# Patient Record
(demographics unavailable — no encounter records)

---

## 2024-10-14 NOTE — ASSESSMENT
[FreeTextEntry1] : Ms. 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  no other s/s of active aiCTD.   HCQ (2022 - present) with signs of hair regrowth  -- check inflammatory markers  -- continue HCQ for now - check levels to assess if this flare related to dropping therapeutic levels -- given also on topicals for the hair - would start with one of those coming off for now -- is better on the plaquneil overall - unclear if the joint and skin is related to the UCTD, MCTD or a new process eg pso -- MRI of the hands  # rash new since last visit. initially forehead rash was 2/2 adhesive tape but that doesnt explain the arms and the chest wall  -- rec derm evaluation to identify what process is going on  #medication monitoring, long term use of drug -- HCQ - eye checks annually  -- check labs -- HCQ - check level    [facility] serve as the continuing focal point for needed health care services that are part of ongoing care related to a patient's single, serious condition or a complex condition.   More than 50% of the encounter was spent counseling the patient on differential, workup, disease course and treatment/management. Education was provided to the patient during this encounter. All questions and concerns were addressed and answered. The patient verbalized understanding and agreed to the plan.   Patient has been instructed to call for an appointment if new symptoms develop. Patient has been instructed to make a follow-up appointment in 3 months   Time spent on the encounter included, but is not limited to, preparing to see the patient, obtaining and/or reviewing separately obtained history, performing the evaluation, counseling and educating, independently interpreting results with communication to patient, order placement, referring and/or communicating with other health professionals as described, and documenting clinical information in the electronic health record.

## 2024-10-14 NOTE — PHYSICAL EXAM
[General Appearance - Alert] : alert [General Appearance - In No Acute Distress] : in no acute distress [Auscultation Breath Sounds / Voice Sounds] : lungs were clear to auscultation bilaterally [Skin Color & Pigmentation] : normal skin color and pigmentation [] : no rash [Skin Lesions] : no skin lesions [Oriented To Time, Place, And Person] : oriented to person, place, and time [Impaired Insight] : insight and judgment were intact [Affect] : the affect was normal [Abnormal Walk] : normal gait [Nail Clubbing] : no clubbing  or cyanosis of the fingernails [Musculoskeletal - Swelling] : no joint swelling seen [Motor Tone] : muscle strength and tone were normal [FreeTextEntry1] : b thumb squaring right MC 3 swelling synvoitis left DIP 5 loss of extension

## 2024-10-14 NOTE — HISTORY OF PRESENT ILLNESS
[___ Month(s) Ago] : [unfilled] month(s) ago [FreeTextEntry1] : Ms. Ramos is a woman PMHX CAD s/p stent; HTN, DMII, inc CPK, pituitary adenoma, HLD here for follow-up of aiCTD  INTERVAL HISTORY  in terms of the joint pain - overall doing well  - all joints are relatively okay  - only one that remains swollen is the right middle finger large knuckle - not terrible at this time though and completely functional   in terms of medicaitons  - adherent and tolerant   in terms of new issues  - s/p cataract/lasik -> photosensitivity  - now with rash - started at juwan forehead at the site of the adhesive tape - seems to be getting better  - developed rash over the chest - no itchiness  - also has it on the arms

## 2024-10-24 NOTE — ASSESSMENT
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 Pt opts for latter - reasonable to do short trial and reassess -- if not improving, recommend biopsy to clarify further - TAC 0.1% ointment BID  Suspect SK on back that has partially resolved - reassess at followup

## 2024-10-24 NOTE — PHYSICAL EXAM
[Alert] : alert [Oriented x 3] : ~L oriented x 3 [Well Nourished] : well nourished [Conjunctiva Non-injected] : conjunctiva non-injected [No Visual Lymphadenopathy] : no visual  lymphadenopathy [No Clubbing] : no clubbing [No Edema] : no edema [No Bromhidrosis] : no bromhidrosis [No Chromhidrosis] : no chromhidrosis [FreeTextEntry3] : Scarring alopecia of the frontal and temporal scalp violaceous to hyperpigmented patch on frontal scalp, around neck, arms bilaterally

## 2024-11-26 NOTE — ASSESSMENT
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 Given improvement  with trial of tac, will continue to monitor on treatment and reassess should rash worsen.   - C/w  TAC 0.1% ointment BID, apply only to active areas on rash on scalp  RTC 2 mo

## 2024-11-26 NOTE — HISTORY OF PRESENT ILLNESS
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 no other s/s of active aiCTD. HCQ (2022 - present) previously seen by Dr. Perea for presumed FFA  in June developed rash on scalp after tape used during cataract surgery also developed rash on arms and around neck later (07-08/2024)  11/26/2024: Since LV has been using triamcinolone to rash on forehead/arms/neck with improvement in itch and pain, doesn't note much change in color. Discoloration on arms and neck unchanged. Notes that the rashes on arms/neck started as purple itchy spots with a red rim that healed as brown flat spots. Scalp continues to be asymptomatic, denies itch, pain, shedding.  also has growth on right back

## 2024-11-26 NOTE — PHYSICAL EXAM
[Alert] : alert [Oriented x 3] : ~L oriented x 3 [Well Nourished] : well nourished [Conjunctiva Non-injected] : conjunctiva non-injected [No Visual Lymphadenopathy] : no visual  lymphadenopathy [No Clubbing] : no clubbing [No Edema] : no edema [No Bromhidrosis] : no bromhidrosis [No Chromhidrosis] : no chromhidrosis [FreeTextEntry3] : Scarring alopecia of the frontal and temporal scalp violaceous to hyperpigmented patch on frontal scalp, around neck, arms bilaterally--fading in color compared to LV, less erythema on scalp lesion  brown reticulated patch in buccal mucosa and gingiva

## 2024-12-13 NOTE — HISTORY OF PRESENT ILLNESS
[FreeTextEntry1] : GEORGINA is a 74 year F w/MHx of CAD s/p PCI, Carotid artery disease, HLD, HTN, T2DM, who presents for follow up. The patient denies fever, chills, sore throat, loss of taste or smell, muscle aches weight loss, malaise, rash, recent travel, insect bites, alteration bowel habits, headaches, weakness, abdominal  pain, bloating, changes in urination, visual disturbances, shortness of breath, chest pain, dizziness, palpitations.  The patient presents for routine follow up of their coronary artery disease.   The patient has been following all secondary prevents measures and antiplatelet therapy. The patient denies shortness of breath, chest pain, dizziness, palpitations.  The patient is here for follow-up of elevated cholesterol. Patient is currently tolerating medication and denies muscle pain, joint pain, back pain,  urinary changes , nausea, vomiting, abdominal pain or diarrhea. The patient is trying to follow a low cholesterol diet.  The patient here for evaluation of high blood pressure. Patient is currently tolerating the current antihypertensive regime and they deny headaches, stiff neck, visual changes, or PND. The patient has been trying to stay on a low-sodium diet.  The patient also presents for continued care of diabetes mellitus. Patient is following the diabetic regimen. Patient is tolerating hypoglycemic agents and following the diet. Patient denies any visual changes, blood in the urine, abdominal pain, nausea, vomiting, myalgias, arthralgias, paresthesias and syncope. The patient denies any chest discomfort, shortness of breath or new neurologic signs or symptoms. Patient denies any polyuria, worsening nocturia, or polydipsia.

## 2025-01-28 NOTE — PHYSICAL EXAM
[Alert] : alert [Oriented x 3] : ~L oriented x 3 [Well Nourished] : well nourished [Conjunctiva Non-injected] : conjunctiva non-injected [No Visual Lymphadenopathy] : no visual  lymphadenopathy [No Clubbing] : no clubbing [No Edema] : no edema [No Bromhidrosis] : no bromhidrosis [No Chromhidrosis] : no chromhidrosis [FreeTextEntry3] : Scarring alopecia of the frontal and temporal scalp violaceous to hyperpigmented patch on frontal scalp, around neck, arms bilaterally--fading in color compared to LV, less erythema/surface change on scalp lesion

## 2025-01-28 NOTE — HISTORY OF PRESENT ILLNESS
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 no other s/s of active aiCTD. HCQ (2022 - present) previously seen by Dr. Perea for presumed FFA (burnt out FFA mostly-never biopsied, treated with doxy, topical clobetasol, topical minoxidil) in June developed rash on scalp after tape used during cataract surgery also developed rash on arms and around neck later (07-08/2024)  11/26/2024: Since  has been using triamcinolone to rash on forehead/arms/neck with improvement in itch and pain, doesn't note much change in color. Discoloration on arms and neck unchanged. Notes that the rashes on arms/neck started as purple itchy spots with a red rim that healed as brown flat spots. Scalp continues to be asymptomatic, denies itch, pain, shedding. 1/28/2025: Since , using triamcinolone BID with improvement in discoloration, erythema and bumpiness resolved, asymptomatic   also has growth on right back

## 2025-01-28 NOTE — ASSESSMENT
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 Given improvement  with trial of tac and patient preference will continue to monitor on treatment and reassess should rash worsen.   -Stop triamcinolone  RTC 3 mo

## 2025-02-11 NOTE — HISTORY OF PRESENT ILLNESS
[___ Month(s) Ago] : [unfilled] month(s) ago [FreeTextEntry1] : Ms. Ramos is a woman PMHX CAD s/p stent; HTN, DMII, inc CPK, pituitary adenoma, HLD here for follow-up of aiCTD  INTERVAL HISTORY  in terms of the joint pain - overall doing well  - all joints are relatively okay  - only one that remains swollen is the right middle finger large knuckle - not terrible at this time though and completely functional   in terms of medicaitons  - adherent and tolerant   in terms of new issues  - doing better in terms of juwan scalp rash

## 2025-02-11 NOTE — ASSESSMENT
[FreeTextEntry1] : Ms. 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  no other s/s of active aiCTD.   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 initially forehead rash was 2/2 adhesive tape but that doesnt explain the arms and the chest wall  -- appreciate derm evaluation -- workign with derm and pleased iwth improvement thus far -- unclear if lichen planus ?  lichenoid dermtaitis?   will reach out to derm to see if they think HCQ involved at all.  as resolving on HCQ would think this is less likely but will inquire  #medication monitoring, long term use of drug -- HCQ - eye checks annually  -- check labs -- HCQ - check level   =========================================== [facility] serve as the continuing focal point for needed health care services that are part of ongoing care related to a patient's single, serious condition or a complex condition.   More than 50% of the encounter was spent counseling the patient on differential, workup, disease course and treatment/management. Education was provided to the patient during this encounter. All questions and concerns were addressed and answered. The patient verbalized understanding and agreed to the plan.   Patient has been instructed to call for an appointment if new symptoms develop. Patient has been instructed to make a follow-up appointment in 3 months   Time spent on the encounter included, but is not limited to, preparing to see the patient, obtaining and/or reviewing separately obtained history, performing the evaluation, counseling and educating, independently interpreting results with communication to patient, order placement, referring and/or communicating with other health professionals as described, and documenting clinical information in the electronic health record.

## 2025-03-20 NOTE — HISTORY OF PRESENT ILLNESS
[FreeTextEntry1] : This is a 75 year y/o female with a PMHx of CAD s/p PCI, Carotid artery disease, HLD, HTN, T2DM presents today for follow up.   s/p NST 1/2025 - Normal myocardial perfusion scan, with no evidence of infarction or inducible ischemia. Pt still doing cardiac rehab and doing well.   The patient presents for routine follow up of their coronary artery disease. The patient has been following all secondary prevents measures and antiplatelet therapy. The patient denies shortness of breath, chest pain, dizziness, palpitations, easy bruising/bleeding/hematuria/blood in stool.  The patient is here for follow-up of elevated cholesterol. Currently tolerating prescribed medications. Denies muscle pain, joint pain, back pain, urinary changes, nausea, vomiting, abdominal pain or diarrhea. The patient is trying to follow a low cholesterol diet.  The patient is here for evaluation of high blood pressure. Currently tolerating antihypertensive medications. Denies headaches, stiff neck, visual changes, or PND. The patient has been trying to stay on a low-sodium diet.  The patient also presents for continued care of diabetes mellitus. Patient is following recommended diabetic regimen. Tolerating hypoglycemic agents and diet recommendations. Denies visual changes, confusion, palpitations, tremors, diaphoresis, blood in the urine, abdominal pain, nausea, vomiting, myalgias, arthralgias, paresthesias and syncope. Too denies any chest discomfort, shortness of breath or new neurologic signs or symptoms. Also denies any polyuria, worsening nocturia, or polydipsia.

## 2025-04-29 NOTE — ASSESSMENT
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 Patient interested in cream to help with PIH. Start hydroquinone as below Differential includes lichen planus/LP-like drug reaction, consider manifestation of lupus though less likely based on clinical presentation today  --Discussed utility of biopsy to confirm diagnosis, patient initially deferred. Given improvement, ok to defer for now but may consider biopsy in the future to confirm diagnosis Overlying inflammation today, likely irritant dermatitis, restart triamcinolone as below. -Re-start triamcinolone of discolored patch on forehead  - START [facility] hydroquinone 4.5%, tretinoin.025%, fluocinolone to hyperpigmented areas on arms once nightly for 3 months and then take 1 month break - Risk of irritation, redness, ochronosis discussed as potential side effects in setting of prolonged use    RTC 3 mo

## 2025-04-29 NOTE — HISTORY OF PRESENT ILLNESS
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 no other s/s of active aiCTD. HCQ (2022 - present) previously seen by Dr. Perea for presumed FFA (burnt out FFA mostly-never biopsied, treated with doxy, topical clobetasol, topical minoxidil) in June developed rash on scalp after tape used during cataract surgery also developed rash on arms and around neck later (07-08/2024)  11/26/2024: Since  has been using triamcinolone to rash on forehead/arms/neck with improvement in itch and pain, doesn't note much change in color. Discoloration on arms and neck unchanged. Notes that the rashes on arms/neck started as purple itchy spots with a red rim that healed as brown flat spots. Scalp continues to be asymptomatic, denies itch, pain, shedding. 1/28/2025: Since , using triamcinolone BID with improvement in discoloration, erythema and bumpiness resolved, asymptomatic  4/29/2025: Since LV, notes itching on site of discoloration on forehead. Patches on arms and neck continuing to fade

## 2025-04-29 NOTE — PHYSICAL EXAM
[Alert] : alert [Oriented x 3] : ~L oriented x 3 [Well Nourished] : well nourished [Conjunctiva Non-injected] : conjunctiva non-injected [No Visual Lymphadenopathy] : no visual  lymphadenopathy [No Clubbing] : no clubbing [No Edema] : no edema [No Bromhidrosis] : no bromhidrosis [No Chromhidrosis] : no chromhidrosis [FreeTextEntry3] : Scarring alopecia of the frontal and temporal scalp violaceous to hyperpigmented patch on frontal scalp, around neck, arms bilaterally--fading in color compared to LV, however with erythema and superficial scaling on this area

## 2025-05-14 NOTE — HISTORY OF PRESENT ILLNESS
[___ Month(s) Ago] : [unfilled] month(s) ago [FreeTextEntry1] : Ms. Ramos is a woman PMHX CAD s/p stent; HTN, DMII, inc CPK, pituitary adenoma, HLD here for follow-up of aiCTD  INTERVAL HISTORY  in terms of the new issues - largely related to her recent dental issues.  She has had lifelong dental isslues which have been particularly active at this point.   has had crown and gum line cavities  - going for 2nd breast scan  - since last wed - the whole rib cage feels like it is in a vice iwth pins - feels cold in her chest.  no sob and no cp or radiation.  all the time not exertion related.  when move or cough . is in cardio rehab - will check in with t he cardio just in case   - DEXA the beginning OP  in terms of the joint pain - overall doing well  - all joints are relatively okay  - only one that remains swollen is the right middle finger large knuckle - not terrible at this time though and completely functional   in terms of medicaitons  - adherent and tolerant   in terms of new issues  - doing better in terms of juwan scalp rash

## 2025-05-14 NOTE — ASSESSMENT
[FreeTextEntry1] : Ms. 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  no other s/s of active aiCTD.  RNP and RNA polymerase III now negative.  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 initially forehead rash was 2/2 adhesive tape but that doesnt explain the arms and the chest wall  -- appreciate derm evaluation -- workign with derm and pleased iwth improvement thus far -- unclear if lichen planus ?  lichenoid dermtaitis?   will reach out to derm to see if they think HCQ involved at all.  as resolving on HCQ would think this is less likely but will inquire  #medication monitoring, long term use of drug -- HCQ - eye checks annually  -- check labs -- HCQ - check level   =========================================== [facility] serve as the continuing focal point for needed health care services that are part of ongoing care related to a patient's single, serious condition or a complex condition.   More than 50% of the encounter was spent counseling the patient on differential, workup, disease course and treatment/management. Education was provided to the patient during this encounter. All questions and concerns were addressed and answered. The patient verbalized understanding and agreed to the plan.   Patient has been instructed to call for an appointment if new symptoms develop. Patient has been instructed to make a follow-up appointment in 3 months   Time spent on the encounter included, but is not limited to, preparing to see the patient, obtaining and/or reviewing separately obtained history, performing the evaluation, counseling and educating, independently interpreting results with communication to patient, order placement, referring and/or communicating with other health professionals as described, and documenting clinical information in the electronic health record.

## 2025-05-22 NOTE — HISTORY OF PRESENT ILLNESS
[FreeTextEntry1] : Ms. Ramos is a 75-year-old female returns today in follow up with regard to a history of type 2 diabetes mellitus. There is no known history of retinopathy, or nephropathy. With regard to neuropathy, she denies any neurologic s/s.  Additional medical history includes that of hyperlipidemia, CAD s/p one stent in rt coronary May 2018. - Pt with osteoarthritis. Follows with rheumatologist.  - Too, with hx of alopecia. Following with Dr. Perea. Was placed on  mg, clobetasol solution, and minoxidil foam BID. She does note improvement in hair growth.  - Currently undergoing study, per patient, she had 3 signs of Lupus.  She continues off of all diabetic medication.   Not using Kellen right now due to ongoing difficulty switching from Kellen 3 to Kellen 3 +. Her insurance is not covering it. Home glucose monitoring is not carried out. She does not like sticking her finger. She does deny any significant hypoglycemic signs and symptoms of late.  She does admit to not monitoring diet well. Drinks cirkul flavored water.  POCT A1C returned today at 5.9%. Previously returned at 6.2% on 03/20/25. POCT glucose today at 112 mg/dL.  She denies any chest pain, sob, neurologic or ophthalmologic complaints. She denies any new podiatric concerns. She is up to date with her ophthalmologic visit- no diabetic changes noted; follows with Dr. Hamm. She had Lasix surgery for cataracts with Dr. Alexis at [facility]. ____________________________________________________________________________________________________ Patient had carotid doppler with Dr. HDZ on 08/01/24 which incidentally showed thyroid nodule.   Latest thyroid US from 08/06/24 showed a 0.40 x 0.34 x 0.36 cm nodule in the right upper pole as well as a 0.77 x 0.45 x 0.65 cm nodule in the left mid pole. The nodularity is non-high risk per Tirads criteria.  Denies anterior neck discomfort, difficulty swallowing, or any change in the appearance of the neck region. ____________________________________________________________________________________________________ Too recently noted to have osteoporosis.  Latest DEXA scan from 04/24/25 did show T-scores: Spine: -2.1 Femoral neck: -3.0 Total hip: -1.2 The lower thoracic and upper lumbar spine are not clearly visualized due to overlying bowel gas and technique, if there is clinical concern for wedge compression deformity, CT thoracolumbar spine may be obtained.  Denies any adult bone fractures. Denies FH of osteoporosis.  No known hx of serum calcium elevations or hx of kidney stones. She denies hx of prolonged corticosteroid usage. No hx of prolonged immobilization.  Denies use of PPI agents. No known celiac hx but is lactose intolerant.  Does take D3 3000 IU daily. Does not take calcium supplementation and does not have adequate calcium in her diet. In the past 3 weeks, she had a root canal and crown and will f/u with dentist in June 2025. Has dental evaluation q6 months. ____________________________________________________________________________________________________ MRI of pituitary from 08/21/2020 and 10/15/2021 did not show evidence for pituitary adenoma whereas a prior MRI from 2/20/2020 had shown a possible 2x2x2 mm apparent pituitary adenoma. Pituitary hormonal eval -neg.  Additional medications: Rosuvastatin 10 mg  Supplements: Vitamin A daily (for many years per another physician), vitamin D3 3,000 IU daily

## 2025-05-22 NOTE — HISTORY OF PRESENT ILLNESS
[FreeTextEntry1] : Ms. Ramos is a 75-year-old female returns today in follow up with regard to a history of type 2 diabetes mellitus. There is no known history of retinopathy, or nephropathy. With regard to neuropathy, she denies any neurologic s/s.  Additional medical history includes that of hyperlipidemia, CAD s/p one stent in rt coronary May 2018. - Pt with osteoarthritis. Follows with rheumatologist.  - Too, with hx of alopecia. Following with Dr. Perea. Was placed on  mg, clobetasol solution, and minoxidil foam BID. She does note improvement in hair growth.  - Currently undergoing study, per patient, she had 3 signs of Lupus.  She continues off of all diabetic medication.   Not using Kellen right now due to ongoing difficulty switching from Kellen 3 to Kellen 3 +. Her insurance is not covering it. Home glucose monitoring is not carried out. She does not like sticking her finger. She does deny any significant hypoglycemic signs and symptoms of late.  She does admit to not monitoring diet well. Drinks cirkul flavored water.  POCT A1C returned today at 5.9%. Previously returned at 6.2% on 03/20/25. POCT glucose today at 112 mg/dL.  She denies any chest pain, sob, neurologic or ophthalmologic complaints. She denies any new podiatric concerns. She is up to date with her ophthalmologic visit- no diabetic changes noted; follows with Dr. Hamm. She had Lasix surgery for cataracts with Dr. Alexis at [facility]. ____________________________________________________________________________________________________ Patient had carotid doppler with Dr. HDZ on 08/01/24 which incidentally showed thyroid nodule.   Latest thyroid US from 08/06/24 showed a 0.40 x 0.34 x 0.36 cm nodule in the right upper pole as well as a 0.77 x 0.45 x 0.65 cm nodule in the left mid pole. The nodularity is non-high risk per Tirads criteria.  Denies anterior neck discomfort, difficulty swallowing, or any change in the appearance of the neck region. ____________________________________________________________________________________________________ Too recently noted to have osteoporosis.  Latest DEXA scan from 04/24/25 did show T-scores: Spine: -2.1 Femoral neck: -3.0 Total hip: -1.2 The lower thoracic and upper lumbar spine are not clearly visualized due to overlying bowel gas and technique, if there is clinical concern for wedge compression deformity, CT thoracolumbar spine may be obtained.  Denies any adult bone fractures. Denies FH of osteoporosis.  No known hx of serum calcium elevations or hx of kidney stones. She denies hx of prolonged corticosteroid usage. No hx of prolonged immobilization.  Denies use of PPI agents. No known celiac hx but is lactose intolerant.  Does take D3 3000 IU daily. Does not take calcium supplementation and does not have adequate calcium in her diet. In the past 3 weeks, she had a root canal and crown and will f/u with dentist in June 2025. Has dental evaluation q6 months. ____________________________________________________________________________________________________ MRI of pituitary from 08/21/2020 and 10/15/2021 did not show evidence for pituitary adenoma whereas a prior MRI from 2/20/2020 had shown a possible 2x2x2 mm apparent pituitary adenoma. Pituitary hormonal eval -neg.  Additional medications: Rosuvastatin 10 mg  Supplements: Vitamin A daily (for many years per another physician), vitamin D3 3,000 IU daily

## 2025-05-22 NOTE — ADDENDUM
[FreeTextEntry1] : POCT glucose testing and Hemoglobin A1c was carried out today given diabetes diagnosis. Blood will be drawn in office today.  This note was written by Margi Barnes on 05/22/2025 acting as medical scribe for Dr. Grey Becker. I, Dr. Grey Becker, have read and attest that all the information, medical decision making and discharge instructions within are true and accurate.

## 2025-05-22 NOTE — HISTORY OF PRESENT ILLNESS
[FreeTextEntry1] : Ms. Ramos is a 75-year-old female returns today in follow up with regard to a history of type 2 diabetes mellitus. There is no known history of retinopathy, or nephropathy. With regard to neuropathy, she denies any neurologic s/s.  Additional medical history includes that of hyperlipidemia, CAD s/p one stent in rt coronary May 2018. - Pt with osteoarthritis. Follows with rheumatologist.  - Too, with hx of alopecia. Following with Dr. Perea. Was placed on  mg, clobetasol solution, and minoxidil foam BID. She does note improvement in hair growth.  - Currently undergoing study, per patient, she had 3 signs of Lupus.  She continues off of all diabetic medication.   Not using Kellen right now due to ongoing difficulty switching from Kellen 3 to Kellen 3 +. Her insurance is not covering it. Home glucose monitoring is not carried out. She does not like sticking her finger. She does deny any significant hypoglycemic signs and symptoms of late.  She does admit to not monitoring diet well. Drinks cirkul flavored water.  POCT A1C returned today at 5.9%. Previously returned at 6.2% on 03/20/25. POCT glucose today at 112 mg/dL.  She denies any chest pain, sob, neurologic or ophthalmologic complaints. She denies any new podiatric concerns. She is up to date with her ophthalmologic visit- no diabetic changes noted; follows with Dr. Hamm. She had Lasix surgery for cataracts with Dr. Alexis at [facility]. ____________________________________________________________________________________________________ Patient had carotid doppler with Dr. HDZ on 08/01/24 which incidentally showed thyroid nodule.   Latest thyroid US from 08/06/24 showed a 0.40 x 0.34 x 0.36 cm nodule in the right upper pole as well as a 0.77 x 0.45 x 0.65 cm nodule in the left mid pole. The nodularity is non-high risk per Tirads criteria.  Denies anterior neck discomfort, difficulty swallowing, or any change in the appearance of the neck region. ____________________________________________________________________________________________________ Too recently noted to have osteoporosis.  Latest DEXA scan from 04/24/25 did show T-scores: Spine: -2.1 Femoral neck: -3.0 Total hip: -1.2 The lower thoracic and upper lumbar spine are not clearly visualized due to overlying bowel gas and technique, if there is clinical concern for wedge compression deformity, CT thoracolumbar spine may be obtained.  Denies any adult bone fractures. Denies FH of osteoporosis.  No known hx of serum calcium elevations or hx of kidney stones. She denies hx of prolonged corticosteroid usage. No hx of prolonged immobilization.  Denies use of PPI agents. No known celiac hx but is lactose intolerant.  Does take D3 3000 IU daily. Does not take calcium supplementation and does not have adequate calcium in her diet. In the past 3 weeks, she had a root canal and crown and will f/u with dentist in June 2025. Has dental evaluation q6 months. ____________________________________________________________________________________________________ MRI of pituitary from 08/21/2020 and 10/15/2021 did not show evidence for pituitary adenoma whereas a prior MRI from 2/20/2020 had shown a possible 2x2x2 mm apparent pituitary adenoma. Pituitary hormonal eval -neg.  Additional medications: Rosuvastatin 10 mg  Supplements: Vitamin A daily (for many years per another physician), vitamin D3 3,000 IU daily

## 2025-06-10 NOTE — ASSESSMENT
[FreeTextEntry1] : Hx of frontal fibrosing alopecia on  mg daily (also for possible UCTD) stable  areas of scarring without inflammation   hyperpigmented to violaceous rash - Differential includes lichen planus/LP-like drug reaction, consider manifestation of lupus though less likely based on clinical presentation today  --Discussed utility of biopsy to confirm diagnosis, patient initially deferred.  Now primarily with postinflammatory hyperpigmentation -- if progresses, recommend biopsy  Use Skin Medicinals to all areas --hydroquinone 4.5%, tretinoin.025%, fluocinolone to hyperpigmented areas on arms once nightly for 3 months and then take 1 month break - Risk of irritation, redness, ochronosis discussed as potential side effects in setting of prolonged use emollients and sunprotection    RTC 3 mo

## 2025-06-10 NOTE — PHYSICAL EXAM
[Alert] : alert [Oriented x 3] : ~L oriented x 3 [Conjunctiva Non-injected] : conjunctiva non-injected [Well Nourished] : well nourished [No Visual Lymphadenopathy] : no visual  lymphadenopathy [No Clubbing] : no clubbing [No Edema] : no edema [No Bromhidrosis] : no bromhidrosis [No Chromhidrosis] : no chromhidrosis [FreeTextEntry3] : Scarring alopecia of the frontal and temporal scalp violaceous to hyperpigmented patch on frontal scalp, around neck, arms bilaterally--fading in color compared to LV

## 2025-06-10 NOTE — HISTORY OF PRESENT ILLNESS
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 no other s/s of active aiCTD. HCQ (2022 - present) previously seen by Dr. Perea for presumed FFA (burnt out FFA mostly-never biopsied, treated with doxy, topical clobetasol, topical minoxidil) in June developed rash on scalp after tape used during cataract surgery also developed rash on arms and around neck later (07-08/2024)  11/26/2024: Since  has been using triamcinolone to rash on forehead/arms/neck with improvement in itch and pain, doesn't note much change in color. Discoloration on arms and neck unchanged. Notes that the rashes on arms/neck started as purple itchy spots with a red rim that healed as brown flat spots. Scalp continues to be asymptomatic, denies itch, pain, shedding. 1/28/2025: Since , using triamcinolone BID with improvement in discoloration, erythema and bumpiness resolved, asymptomatic  4/29/2025: Since LV, notes itching on site of discoloration on forehead. Patches on arms and neck continuing to fade  [FreeTextEntry1] : followup

## 2025-07-09 NOTE — ASSESSMENT
[FreeTextEntry1] : renal US reviewed: no stone, no hydro, no solid renal mass. Excellent- stable, wnl.  Doing well No new stones, overall stable, and feeling well  RTC 1 year with renal US

## 2025-07-09 NOTE — HISTORY OF PRESENT ILLNESS
[None] : no symptoms [FreeTextEntry1] : Now 76 yr old female presents for f/u stones.. Pt had a kidney stone approx 2017- Dr. Dill. She was able to pass the stone. Pt states that only stone episode she is aware of. Pt denies dysuria, hematuria or abd/ flank pain.   For f/u ~ 1 year since last scan  Recently diagnosed with Osteoporosis. Cannot take Prolia due dental issues.  Feeling well

## 2025-07-18 NOTE — DISCUSSION/SUMMARY
[FreeTextEntry1] : My cumulative time spent on today's visit included: Preparation for the visit, review of the medical records, review of pertinent diagnostic studies, review and integration of laboratory data, coordination of care, examination and counseling of the patient on the above diagnosis, treatment plan and prognosis, orders of diagnostic tests and any additional lab data ordered today, medications and/or appropriate procedures and documentation in the medical records of today's visit. Patient advised and verbally acknowledged to make a follow up appt 3-4 months and to call the office immediately for follow up for any change in their condition. pt. that failure to comply with follow-up and/or notification of office for change in the condition can result in severe health consequences and worsening of their overall condition. My plan today was given to the patient in writing with all instructions discussed verbally above.

## 2025-07-18 NOTE — PHYSICAL EXAM
[Well Developed] : well developed [Well Nourished] : well nourished [No Acute Distress] : no acute distress [Normal Conjunctiva] : normal conjunctiva [Normal Venous Pressure] : normal venous pressure [No Carotid Bruit] : no carotid bruit [Clear Lung Fields] : clear lung fields [Good Air Entry] : good air entry [No Respiratory Distress] : no respiratory distress  [Soft] : abdomen soft [Non Tender] : non-tender [No Masses/organomegaly] : no masses/organomegaly [Normal Bowel Sounds] : normal bowel sounds [Normal Gait] : normal gait [No Edema] : no edema [No Cyanosis] : no cyanosis [No Clubbing] : no clubbing [No Varicosities] : no varicosities [No Rash] : no rash [No Skin Lesions] : no skin lesions [Moves all extremities] : moves all extremities [No Focal Deficits] : no focal deficits [Normal Speech] : normal speech [Alert and Oriented] : alert and oriented [Normal memory] : normal memory [de-identified] : Regular rate and rhythm, NL S1, S2, non-displaced PMI, chest non-tender; no rubs,heaves  or gallops a  Grade 2/6 systolic murmur noted at the LSB

## 2025-07-18 NOTE — PHYSICAL EXAM
[Well Developed] : well developed [Well Nourished] : well nourished [No Acute Distress] : no acute distress [Normal Conjunctiva] : normal conjunctiva [Normal Venous Pressure] : normal venous pressure [No Carotid Bruit] : no carotid bruit [Clear Lung Fields] : clear lung fields [Good Air Entry] : good air entry [No Respiratory Distress] : no respiratory distress  [Soft] : abdomen soft [Non Tender] : non-tender [No Masses/organomegaly] : no masses/organomegaly [Normal Bowel Sounds] : normal bowel sounds [Normal Gait] : normal gait [No Edema] : no edema [No Cyanosis] : no cyanosis [No Clubbing] : no clubbing [No Varicosities] : no varicosities [No Rash] : no rash [No Skin Lesions] : no skin lesions [Moves all extremities] : moves all extremities [No Focal Deficits] : no focal deficits [Normal Speech] : normal speech [Alert and Oriented] : alert and oriented [Normal memory] : normal memory [de-identified] : Regular rate and rhythm, NL S1, S2, non-displaced PMI, chest non-tender; no rubs,heaves  or gallops a  Grade 2/6 systolic murmur noted at the LSB

## 2025-07-18 NOTE — HISTORY OF PRESENT ILLNESS
[FreeTextEntry1] : This is a 76 year y/o female with a PMHx of CAD s/p PCI, Carotid artery disease, HLD, HTN, T2DM presents today for follow up.   CAD s/p PCI  s/p NST 1/2025 - Normal myocardial perfusion scan, with no evidence of infarction or inducible ischemia.  Recently diagnosed with Osteoporosis, saw Dr Becker. Cannot take Prolia due dental issues.. Pt was advised to start calcium supplementation, however pt is careful as has hx of kidney stones - sees urology - Dr Hoenig.   The patient presents for evaluation of an ongoing active management high blood pressure and hyperlipidemia. Patient is currently tolerating the current  antihypertensive regime and they deny headaches, stiff neck, visual changes, pedal Edema or PND. As for follow-up of elevated cholesterol.  We reviewed today ongoing opportunities to maximize medical therapy. patient is currently tolerating medication and and does not complain of new  muscle pain, joint pain, back pain,urinary changes ,nausea, vomiting, abdominal pain or diarrhea. The patient is trying to follow a low cholesterol diet.  The patient also presents for follow and to optimize medical management up of their coronary artery disease.  The patient denies shortness of breath, chest pain, dizziness, palpitations. The patient has been following all secondary prevents measures and antiplatelet therapy.  The patient also presents for continued care of diabetes mellitus. Patient is following recommended diabetic regimen. Tolerating hypoglycemic agents and diet recommendations. Denies visual changes, confusion, palpitations, tremors, diaphoresis, blood in the urine, abdominal pain, nausea, vomiting, myalgias, arthralgias, paresthesias and syncope. Too denies any chest discomfort, shortness of breath or new neurologic signs or symptoms. Also denies any polyuria, worsening nocturia, or polydipsia.